# Patient Record
Sex: MALE | Race: WHITE | NOT HISPANIC OR LATINO | Employment: FULL TIME | URBAN - METROPOLITAN AREA
[De-identification: names, ages, dates, MRNs, and addresses within clinical notes are randomized per-mention and may not be internally consistent; named-entity substitution may affect disease eponyms.]

---

## 2019-08-29 ENCOUNTER — TRANSFERRED RECORDS (OUTPATIENT)
Dept: HEALTH INFORMATION MANAGEMENT | Facility: CLINIC | Age: 44
End: 2019-08-29

## 2020-10-12 ENCOUNTER — MEDICAL CORRESPONDENCE (OUTPATIENT)
Dept: HEALTH INFORMATION MANAGEMENT | Facility: CLINIC | Age: 45
End: 2020-10-12

## 2020-10-12 ENCOUNTER — TRANSFERRED RECORDS (OUTPATIENT)
Dept: HEALTH INFORMATION MANAGEMENT | Facility: CLINIC | Age: 45
End: 2020-10-12

## 2021-10-22 ENCOUNTER — OFFICE VISIT (OUTPATIENT)
Dept: FAMILY MEDICINE | Facility: CLINIC | Age: 46
End: 2021-10-22
Payer: COMMERCIAL

## 2021-10-22 VITALS
SYSTOLIC BLOOD PRESSURE: 120 MMHG | RESPIRATION RATE: 16 BRPM | HEIGHT: 77 IN | DIASTOLIC BLOOD PRESSURE: 82 MMHG | TEMPERATURE: 98 F | HEART RATE: 54 BPM | BODY MASS INDEX: 30.7 KG/M2 | OXYGEN SATURATION: 98 % | WEIGHT: 260 LBS

## 2021-10-22 DIAGNOSIS — Z13.220 SCREENING CHOLESTEROL LEVEL: ICD-10-CM

## 2021-10-22 DIAGNOSIS — Z12.11 COLON CANCER SCREENING: ICD-10-CM

## 2021-10-22 DIAGNOSIS — Z13.1 SCREENING FOR DIABETES MELLITUS: ICD-10-CM

## 2021-10-22 DIAGNOSIS — Z00.00 ROUTINE GENERAL MEDICAL EXAMINATION AT A HEALTH CARE FACILITY: Primary | ICD-10-CM

## 2021-10-22 DIAGNOSIS — Z23 NEED FOR PROPHYLACTIC VACCINATION AND INOCULATION AGAINST INFLUENZA: ICD-10-CM

## 2021-10-22 PROBLEM — Z83.719 FAMILY HISTORY OF COLONIC POLYPS: Status: ACTIVE | Noted: 2020-10-01

## 2021-10-22 PROBLEM — F33.0 MAJOR DEPRESSIVE DISORDER, RECURRENT EPISODE, MILD (H): Status: ACTIVE | Noted: 2017-08-24

## 2021-10-22 LAB
CHOLEST SERPL-MCNC: 202 MG/DL
FASTING STATUS PATIENT QL REPORTED: NO
HBA1C MFR BLD: 5 % (ref 0–5.6)
HDLC SERPL-MCNC: 39 MG/DL
LDLC SERPL CALC-MCNC: 97 MG/DL
NONHDLC SERPL-MCNC: 163 MG/DL
TRIGL SERPL-MCNC: 328 MG/DL

## 2021-10-22 PROCEDURE — 90686 IIV4 VACC NO PRSV 0.5 ML IM: CPT | Performed by: FAMILY MEDICINE

## 2021-10-22 PROCEDURE — 36415 COLL VENOUS BLD VENIPUNCTURE: CPT | Performed by: FAMILY MEDICINE

## 2021-10-22 PROCEDURE — 83036 HEMOGLOBIN GLYCOSYLATED A1C: CPT | Performed by: FAMILY MEDICINE

## 2021-10-22 PROCEDURE — 80061 LIPID PANEL: CPT | Performed by: FAMILY MEDICINE

## 2021-10-22 PROCEDURE — 90471 IMMUNIZATION ADMIN: CPT | Performed by: FAMILY MEDICINE

## 2021-10-22 PROCEDURE — 99386 PREV VISIT NEW AGE 40-64: CPT | Mod: 25 | Performed by: FAMILY MEDICINE

## 2021-10-22 RX ORDER — CETIRIZINE HYDROCHLORIDE 10 MG/1
10 TABLET ORAL DAILY PRN
COMMUNITY
End: 2022-03-17

## 2021-10-22 RX ORDER — MULTIVITAMIN,THERAPEUTIC
1 TABLET ORAL DAILY
COMMUNITY

## 2021-10-22 ASSESSMENT — ENCOUNTER SYMPTOMS
EYE PAIN: 0
ARTHRALGIAS: 0
DYSURIA: 0
PARESTHESIAS: 0
HEMATURIA: 0
SHORTNESS OF BREATH: 0
HEADACHES: 0
DIZZINESS: 0
WEAKNESS: 0
MYALGIAS: 0
ABDOMINAL PAIN: 0
PALPITATIONS: 0
HEARTBURN: 0
CHILLS: 0
NERVOUS/ANXIOUS: 0
HEMATOCHEZIA: 0
DIARRHEA: 0
CONSTIPATION: 0
FEVER: 0
SORE THROAT: 0
FREQUENCY: 0
NAUSEA: 0
COUGH: 0
JOINT SWELLING: 0

## 2021-10-22 ASSESSMENT — MIFFLIN-ST. JEOR: SCORE: 2180.69

## 2021-10-22 NOTE — PROGRESS NOTES
SUBJECTIVE:   CC: Marv Santamaria is an 46 year old male who presents for preventative health visit.       Patient has been advised of split billing requirements and indicates understanding: Yes  Healthy Habits:     Getting at least 3 servings of Calcium per day:  Yes    Bi-annual eye exam:  Yes    Dental care twice a year:  Yes    Sleep apnea or symptoms of sleep apnea:  Sleep apnea    Diet:  Regular (no restrictions)    Frequency of exercise:  2-3 days/week    Duration of exercise:  Greater than 60 minutes    Taking medications regularly:  Not Applicable    Medication side effects:  Not applicable    PHQ-2 Total Score: 0    Additional concerns today:  No      Immunizations: up to date   Cholesterol: Obtain screening today.   Aspirin: Not indicated.   Diabetes: Obtain screening today.   Colon Cancer: Obtain screening with colonoscopy. Guidelines now say age 45.   HIV screen: Negative 4/28/2018.   Hepatitis C screen: Negative 4/28/2018   Diet/Exercise: Exercising 2-3 days per week.   Tobacco: No use.     Has a therapist for anxiety and depression that he will occasionally see that is helpful and is doing well at this time with current situation.     Gout concern  Pain on 1st MTP joint.   Stated a few days again and was red and more painful.   Has been improving over last couple of days.   Has been using ibuprofen which has been helping.   First gout flare.       Today's PHQ-2 Score:   PHQ-2 ( 1999 Pfizer) 10/22/2021   Q1: Little interest or pleasure in doing things 0   Q2: Feeling down, depressed or hopeless 0   PHQ-2 Score 0   Q1: Little interest or pleasure in doing things Not at all   Q2: Feeling down, depressed or hopeless Not at all   PHQ-2 Score 0       Abuse: Current or Past(Physical, Sexual or Emotional)- No  Do you feel safe in your environment? Yes    Have you ever done Advance Care Planning? (For example, a Health Directive, POLST, or a discussion with a medical provider or your loved ones about your  wishes): No, advance care planning information given to patient to review.  Patient plans to discuss their wishes with loved ones or provider.      Social History     Tobacco Use     Smoking status: Former Smoker     Years: 2.00     Types: Cigarettes     Smokeless tobacco: Never Used   Substance Use Topics     Alcohol use: Yes     Comment: 4-5 times a week has  a couple     If you drink alcohol do you typically have >3 drinks per day or >7 drinks per week? No    Alcohol Use 10/22/2021   Prescreen: >3 drinks/day or >7 drinks/week? No   Prescreen: >3 drinks/day or >7 drinks/week? -       Last PSA: No results found for: PSA    Reviewed orders with patient. Reviewed health maintenance and updated orders accordingly - Yes      Reviewed and updated as needed this visit by clinical staff  Tobacco  Allergies  Meds  Problems  Med Hx  Surg Hx  Fam Hx  Soc Hx          Reviewed and updated as needed this visit by Provider  Tobacco  Allergies  Meds  Problems  Med Hx  Surg Hx  Fam Hx             Review of Systems   Constitutional: Negative for chills and fever.   HENT: Negative for congestion, ear pain, hearing loss and sore throat.    Eyes: Negative for pain and visual disturbance.   Respiratory: Negative for cough and shortness of breath.    Cardiovascular: Negative for chest pain, palpitations and peripheral edema.   Gastrointestinal: Negative for abdominal pain, constipation, diarrhea, heartburn, hematochezia and nausea.   Genitourinary: Negative for discharge, dysuria, frequency, genital sores, hematuria, impotence and urgency.   Musculoskeletal: Negative for arthralgias, joint swelling and myalgias.   Skin: Negative for rash.   Neurological: Negative for dizziness, weakness, headaches and paresthesias.   Psychiatric/Behavioral: Negative for mood changes. The patient is not nervous/anxious.        OBJECTIVE:   /82 (BP Location: Left arm, Patient Position: Chair, Cuff Size: Adult Large)   Pulse 54   Temp  "98  F (36.7  C) (Tympanic)   Resp 16   Ht 1.962 m (6' 5.25\")   Wt 117.9 kg (260 lb)   SpO2 98%   BMI 30.63 kg/m      Physical Exam  General: Alert, cooperative, no acute distress  Head: Normocephalic, atraumatic   Eyes: PERRL, no scleral icterus, no conjunctival injection   Ears: External ear without deformity, external canals patent, TM intact with no signs of infection   Nose: nares patent  Throat: Oropharynx clear, non-erythematous, tonsils non-enlarged   Neck: supple, trachea midline, no goiter   CV: RRR, no murmur   Lungs: Clear, non-labored breathing, No rales or rhonchi   Abdomen: Bowel sounds active, soft, non-tender, no guarding.   Extremities: No peripheral edema, calves non-tender.   Right foot: first MTP mildy erythematous. Minimal tenderness to palpation on plantar side of first MTP. Able to move joint freely with minimal discomfort. No streaking up the foot.   MSK: strength intact in upper and lower extremities. Gait normal.   Neuro: CN II-XII grossly intact. No focal deficits. Sensation intact.       ASSESSMENT/PLAN:   Marv was seen today for establish care, physical, flu shot and imm/inj.    Diagnoses and all orders for this visit:    Routine general medical examination at a health care facility  Immunizations: up to date   Cholesterol: Obtain screening today.   Aspirin: Not indicated.   Diabetes: Obtain screening today.   Colon Cancer: Obtain screening with colonoscopy. Guidelines screen age 45.   HIV screen: Negative 4/28/2018.   Hepatitis C screen: Negative 4/28/2018   Diet/Exercise: Exercising 2-3 days per week.   Tobacco: No use.     Need for prophylactic vaccination and inoculation against influenza  -     INFLUENZA VACCINE IM > 6 MONTHS VALENT IIV4 (AFLURIA/FLUZONE)    Screening for diabetes mellitus  -     Hemoglobin A1c    Screening cholesterol level  -     Lipid panel reflex to direct LDL Non-fasting    Colon cancer screening  -     Adult Gastro Ref - Procedure Only; " "Future      Patient has been advised of split billing requirements and indicates understanding: Yes  COUNSELING:   Reviewed preventive health counseling, as reflected in patient instructions       Regular exercise       Colon cancer screening    Estimated body mass index is 30.63 kg/m  as calculated from the following:    Height as of this encounter: 1.962 m (6' 5.25\").    Weight as of this encounter: 117.9 kg (260 lb).     Weight management plan: Discussed healthy diet and exercise guidelines    He reports that he has quit smoking. His smoking use included cigarettes. He quit after 2.00 years of use. He has never used smokeless tobacco.      Counseling Resources:  ATP IV Guidelines  Pooled Cohorts Equation Calculator  FRAX Risk Assessment  ICSI Preventive Guidelines  Dietary Guidelines for Americans, 2010  USDA's MyPlate  ASA Prophylaxis  Lung CA Screening    Deep Birch DO  [facility]  "

## 2021-10-22 NOTE — PATIENT INSTRUCTIONS
Dr. Foster in [facility] for vasectomy.     Continue to work on healthy diet and exercise.     Lab test today.       Patient Education     Gout Diet  Gout is a painful condition caused by an excess of uric acid, a waste product made by the body. Uric acid forms crystals that collect in the joints. The immune response to these crystals brings on symptoms of joint pain and swelling. This is called a gout attack. Often, medications and diet changes are combined to manage gout. Below are some guidelines for changing your diet to help you manage gout and prevent attacks. Your healthcare provider will help you determine the best eating plan for you.  Eating to manage gout  Weight loss for those who are overweight may help reduce gout attacks.  Eat less of these foods  Eating too many foods containing purines may raise the levels of uric acid in your body. This raises your risk for a gout attack. Try to limit these foods and drinks:    Alcohol, such as beer and red wine. You may be told to avoid alcohol completely.    Soft drinks that contain sugar or high fructose corn syrup    Certain fish, including anchovies, sardines, fish eggs, and herring    Shellfish    Certain meats, such as red meat, hot dogs, luncheon meats, and turkey    Organ meats, such as liver, kidneys, and sweetbreads    Legumes, such as dried beans and peas    Other high fat foods such as gravy, whole milk, and high fat cheeses    Vegetables such as asparagus, cauliflower, spinach, and mushrooms used to be thought to contribute to an increased risk for a gout attack, but recent studies show that high purine vegetables don't increase the risk for a gout attack.  Eat more of these foods  Other foods may be helpful for people with gout. Add some of these foods to your diet:    Cherries contain chemicals that may lower uric acid.    Omega fatty acids. These are found in some fatty fish such as salmon, certain oils (flax, olive, or nut), and nuts  themselves. Omega fatty acids may help prevent inflammation due to gout.    Dairy products that are low-fat or fat-free, such as cheese and yogurt    Complex carbohydrate foods, including whole grains, brown rice, oats, and beans    Coffee, in moderation    Water, approximately 64 ounces per day  Follow-up care  Follow up with your healthcare provider as advised.  When to seek medical advice  Call your healthcare provider right away if any of these occur:    Return of gout symptoms, usually at night:    Severe pain, swelling, and heat in a joint, especially the base of the big toe    Affected joint is hard to move    Skin of the affected joint is purple or red    Fever of 100.4 F (38 C) or higher    Pain that doesn't get better even with prescribed medicine   [employer] last reviewed this educational content on 6/1/2018 2000-2021 [employer]. All rights reserved. This information is not intended as a substitute for professional medical care. Always follow your healthcare professional's instructions.             Preventive Health Recommendations  Male Ages 40 to 49    Yearly exam:             See your health care provider every year in order to  o   Review health changes.   o   Discuss preventive care.    o   Review your medicines if your doctor has prescribed any.    You should be tested each year for STDs (sexually transmitted diseases) if you re at risk.     Have a cholesterol test every 5 years.     Have a colonoscopy (test for colon cancer) if someone in your family has had colon cancer or polyps before age 50.     After age 45, have a diabetes test (fasting glucose). If you are at risk for diabetes, you should have this test every 3 years.      Talk with your health care provider about whether or not a prostate cancer screening test (PSA) is right for you.    Shots: Get a flu shot each year. Get a tetanus shot every 10 years.     Nutrition:    Eat at least 5 servings of fruits and vegetables  daily.     Eat whole-grain bread, whole-wheat pasta and brown rice instead of white grains and rice.     Get adequate Calcium and Vitamin D.     Lifestyle    Exercise for at least 150 minutes a week (30 minutes a day, 5 days a week). This will help you control your weight and prevent disease.     Limit alcohol to one drink per day.     No smoking.     Wear sunscreen to prevent skin cancer.     See your dentist every six months for an exam and cleaning.

## 2022-01-19 ENCOUNTER — HOSPITAL ENCOUNTER (OUTPATIENT)
Facility: CLINIC | Age: 47
End: 2022-01-19
Attending: SURGERY | Admitting: SURGERY
Payer: COMMERCIAL

## 2022-01-19 ENCOUNTER — TELEPHONE (OUTPATIENT)
Dept: SLEEP MEDICINE | Facility: CLINIC | Age: 47
End: 2022-01-19
Payer: COMMERCIAL

## 2022-01-19 NOTE — TELEPHONE ENCOUNTER
Reason for Call:  Other call back    Detailed comments: patient has an appt in March but needs supplies before the visit    Phone Number Patient can be reached at: Home number on file [phone] (home)    Best Time: 8-3    Can we leave a detailed message on this number? YES    Call taken on 1/19/2022 at 11:52 AM by Chyna Rubi

## 2022-01-20 NOTE — TELEPHONE ENCOUNTER
Patient was informed he would need to establish care prior to orders being signed. Patient also instructed to have previous sleep study sent to sleep center.

## 2022-01-21 ENCOUNTER — TELEPHONE (OUTPATIENT)
Dept: FAMILY MEDICINE | Facility: CLINIC | Age: 47
End: 2022-01-21
Payer: COMMERCIAL

## 2022-01-21 DIAGNOSIS — G47.33 OSA (OBSTRUCTIVE SLEEP APNEA): Primary | ICD-10-CM

## 2022-01-21 NOTE — TELEPHONE ENCOUNTER
"Writer noted the following message from the patient in the \"Pt CRM Request\" folder:        Hina Marv MEDRANO  Patient Customer Service Request Pool 2 days ago     Topic: Non-Medical Question.     I received a cpap maching from [employer] two years ago. I need to order new tubing and head gear for it, but [facility] is no longer in network. Can Dr Birch do a prescription for this, or will it require I see a new sleep doctor?     Thanks, Kelvin        Routed to PCP for review.    Padma Kumar RN  [facility]  "

## 2022-02-10 ENCOUNTER — OFFICE VISIT (OUTPATIENT)
Dept: FAMILY MEDICINE | Facility: CLINIC | Age: 47
End: 2022-02-10
Payer: COMMERCIAL

## 2022-02-10 VITALS
HEIGHT: 77 IN | BODY MASS INDEX: 31.41 KG/M2 | TEMPERATURE: 97.4 F | RESPIRATION RATE: 16 BRPM | SYSTOLIC BLOOD PRESSURE: 130 MMHG | DIASTOLIC BLOOD PRESSURE: 87 MMHG | HEART RATE: 59 BPM | WEIGHT: 266 LBS

## 2022-02-10 DIAGNOSIS — Z30.2 ENCOUNTER FOR STERILIZATION: Primary | ICD-10-CM

## 2022-02-10 PROCEDURE — 99212 OFFICE O/P EST SF 10 MIN: CPT | Performed by: FAMILY MEDICINE

## 2022-02-10 RX ORDER — DIAZEPAM 10 MG
TABLET ORAL
Qty: 1 TABLET | Refills: 0 | Status: SHIPPED | OUTPATIENT
Start: 2022-02-10 | End: 2022-03-17

## 2022-02-10 RX ORDER — OXYCODONE AND ACETAMINOPHEN 5; 325 MG/1; MG/1
1 TABLET ORAL EVERY 6 HOURS PRN
Qty: 12 TABLET | Refills: 0 | Status: SHIPPED | OUTPATIENT
Start: 2022-02-10 | End: 2022-02-13

## 2022-02-10 ASSESSMENT — SLEEP AND FATIGUE QUESTIONNAIRES
HOW LIKELY ARE YOU TO NOD OFF OR FALL ASLEEP WHILE SITTING AND READING: SLIGHT CHANCE OF DOZING
HOW LIKELY ARE YOU TO NOD OFF OR FALL ASLEEP WHILE SITTING AND TALKING TO SOMEONE: WOULD NEVER DOZE
HOW LIKELY ARE YOU TO NOD OFF OR FALL ASLEEP IN A CAR, WHILE STOPPED FOR A FEW MINUTES IN TRAFFIC: WOULD NEVER DOZE
HOW LIKELY ARE YOU TO NOD OFF OR FALL ASLEEP WHILE LYING DOWN TO REST IN THE AFTERNOON WHEN CIRCUMSTANCES PERMIT: SLIGHT CHANCE OF DOZING
HOW LIKELY ARE YOU TO NOD OFF OR FALL ASLEEP WHILE SITTING INACTIVE IN A PUBLIC PLACE: WOULD NEVER DOZE
HOW LIKELY ARE YOU TO NOD OFF OR FALL ASLEEP WHILE WATCHING TV: WOULD NEVER DOZE
HOW LIKELY ARE YOU TO NOD OFF OR FALL ASLEEP WHILE SITTING QUIETLY AFTER LUNCH WITHOUT ALCOHOL: WOULD NEVER DOZE
HOW LIKELY ARE YOU TO NOD OFF OR FALL ASLEEP WHEN YOU ARE A PASSENGER IN A CAR FOR AN HOUR WITHOUT A BREAK: WOULD NEVER DOZE

## 2022-02-10 ASSESSMENT — ANXIETY QUESTIONNAIRES
7. FEELING AFRAID AS IF SOMETHING AWFUL MIGHT HAPPEN: NOT AT ALL
3. WORRYING TOO MUCH ABOUT DIFFERENT THINGS: SEVERAL DAYS
5. BEING SO RESTLESS THAT IT IS HARD TO SIT STILL: NOT AT ALL
6. BECOMING EASILY ANNOYED OR IRRITABLE: NOT AT ALL
GAD7 TOTAL SCORE: 2
1. FEELING NERVOUS, ANXIOUS, OR ON EDGE: SEVERAL DAYS
2. NOT BEING ABLE TO STOP OR CONTROL WORRYING: NOT AT ALL

## 2022-02-10 ASSESSMENT — MIFFLIN-ST. JEOR: SCORE: 2207.91

## 2022-02-10 ASSESSMENT — PAIN SCALES - GENERAL: PAINLEVEL: NO PAIN (0)

## 2022-02-10 ASSESSMENT — PATIENT HEALTH QUESTIONNAIRE - PHQ9
SUM OF ALL RESPONSES TO PHQ QUESTIONS 1-9: 3
5. POOR APPETITE OR OVEREATING: NOT AT ALL

## 2022-02-10 NOTE — PROGRESS NOTES
"SUBJECTIVE:                                                    Marv Santamaria is a 46 year old male who presents to clinic today for the following health issues:    Chief Complaint   Patient presents with     Consult     for vasectomy     Answers for HPI/ROS submitted by the patient on 2/10/2022  How many servings of fruits and vegetables do you eat daily?: 2-3  On average, how many sweetened beverages do you drink each day (Examples: soda, juice, sweet tea, etc.  Do NOT count diet or artificially sweetened beverages)?: 0  How many minutes a day do you exercise enough to make your heart beat faster?: 9 or less  How many days a week do you exercise enough to make your heart beat faster?: 3 or less  How many days per week do you miss taking your medication?: 0        OBJECTIVE:                                                    /87   Pulse 59   Temp 97.4  F (36.3  C) (Tympanic)   Resp 16   Ht 1.962 m (6' 5.25\")   Wt 120.7 kg (266 lb)   BMI 31.34 kg/m   Body mass index is 31.34 kg/m .     SUBJECTIVE:  nathalie comes in for a vasectomy consultation.  He and his partner have decided they don't want to have any more children. They have decided that he will have the sterilization procedure instead of her.  Patient was given information to read prior to the consultation.      We talked about the risks and benefits of the vasectomy; risks being that of potential for bleeding, infection, postoperative discomfort immediately which can last for a number of weeks afterwards, also the development of spermatoceles or sperm granulomas, epididymal cysts and the possibility of failure of the procedure producing failed attempt at sterility.     Also mentioned to the patient that there is some literature out there that suggests men who have vasectomies are at increased risk for prostate cancer; however, this literature is inconclusive and controversial.  It is recommended that men who have vasectomies should have annual " prostate exams through the rectum yearly after age 40 and also may benefit from a screening prostatic specific antigen test after age 40.  We also discussed signs and symptoms of prostatic enlargement or prostatic problems.     I went through the procedure with the patient, how it is done, a midline incision in the scrotum measuring approximately 1/2 inch in length.  The vas deferens is brought up through this incision, dissected free and a small portion, maybe 0.5 cm. in length is removed.   cauterized and then the proximal or distal end is buried away from the other.  Patient had no questions when it came to the procedure itself.  I did show him pictures and diagrams of the procedure.  I showed him where a potential spermatocele or sperm granuloma can occur.      Again, the patient had no further questions for me.    OBJECTIVE:  On exam, this is a well-developed, well-nourished white male.      Exam reveals a normal circumcised penis, no lesions.  Testes are descended bilaterally.  Exam was limited to the genitalia.  Both vas deferens were easily identified.  No other abnormalities were noted.      ASSESSMENT:  Undesired fertility in an adult male, requesting vasectomy.    PLAN:  He will schedule a vasectomy at his convenience for either the last appointment of the morning He is aware that he will need to take the entire weekend off and have essentially bedrest for two days with ice packs every two hours and ibuprofen for discomfort.  I gave him a prescription for ibuprofen 800 mg. to take every six to eight hours with food after the procedure.  He will take one tablet half an hour before the procedure along with Valium 10 mg.    If he has any further questions, he will contact me prior to the procedure or ask me on the day of the procedure.  He also is aware that he will need to bring a specimen after 10-12 ejaculations to make sure that he has cleared the storage vesicles of any residual sperm and to make sure  that he is sterile.

## 2022-02-11 ENCOUNTER — TELEPHONE (OUTPATIENT)
Dept: SLEEP MEDICINE | Facility: CLINIC | Age: 47
End: 2022-02-11
Payer: COMMERCIAL

## 2022-02-11 ASSESSMENT — ANXIETY QUESTIONNAIRES: GAD7 TOTAL SCORE: 2

## 2022-02-11 NOTE — TELEPHONE ENCOUNTER
Reason for call:  Other   Patient called regarding (reason for call): call back  Additional comments: Patient is having a hard time trying to update the data for the CPAP, Patient would like to bring his CPAP in to get the data off the CPAP before his appointment with Asim on 2/15    Phone number to reach patient:  Cell number on file:    Telephone Information:   Mobile [phone]       Best Time:  Anytime    Can we leave a detailed message on this number?  YES    Travel screening: Not Applicable

## 2022-02-11 NOTE — TELEPHONE ENCOUNTER
Patient has a [email or web address], [employer] has remote access. Our office will contact  [facility] for copy of download to be faxed to our clinic.     Phone call to  [facility] (ph. [phone]) to fax a detailed compliance and therapy cpap DL to fax [phone].

## 2022-02-12 DIAGNOSIS — Z11.59 ENCOUNTER FOR SCREENING FOR OTHER VIRAL DISEASES: Primary | ICD-10-CM

## 2022-02-15 ENCOUNTER — VIRTUAL VISIT (OUTPATIENT)
Dept: SLEEP MEDICINE | Facility: CLINIC | Age: 47
End: 2022-02-15
Payer: COMMERCIAL

## 2022-02-15 VITALS — BODY MASS INDEX: 28.93 KG/M2 | HEIGHT: 78 IN | WEIGHT: 250 LBS

## 2022-02-15 DIAGNOSIS — G47.33 OSA (OBSTRUCTIVE SLEEP APNEA): Primary | ICD-10-CM

## 2022-02-15 PROCEDURE — 99205 OFFICE O/P NEW HI 60 MIN: CPT | Mod: 95 | Performed by: INTERNAL MEDICINE

## 2022-02-15 ASSESSMENT — MIFFLIN-ST. JEOR: SCORE: 2147.24

## 2022-02-15 NOTE — PATIENT INSTRUCTIONS
Your BMI is Body mass index is 28.89 kg/m .  Weight management is a personal decision.  If you are interested in exploring weight loss strategies, the following discussion covers the approaches that may be successful. Body mass index (BMI) is one way to tell whether you are at a healthy weight, overweight, or obese. It measures your weight in relation to your height.  A BMI of 18.5 to 24.9 is in the healthy range. A person with a BMI of 25 to 29.9 is considered overweight, and someone with a BMI of 30 or greater is considered obese. More than two-thirds of American adults are considered overweight or obese.  Being overweight or obese increases the risk for further weight gain. Excess weight may lead to heart disease and diabetes.  Creating and following plans for healthy eating and physical activity may help you improve your health.  Weight control is part of healthy lifestyle and includes exercise, emotional health, and healthy eating habits. Careful eating habits lifelong are the mainstay of weight control. Though there are significant health benefits from weight loss, long-term weight loss with diet alone may be very difficult to achieve- studies show long-term success with dietary management in less than 10% of people. Attaining a healthy weight may be especially difficult to achieve in those with severe obesity. In some cases, medications, devices and surgical management might be considered.  What can you do?  If you are overweight or obese and are interested in methods for weight loss, you should discuss this with your provider.     Consider reducing daily calorie intake by 500 calories.     Keep a food journal.     Avoiding skipping meals, consider cutting portions instead.    Diet combined with exercise helps maintain muscle while optimizing fat loss. Strength training is particularly important for building and maintaining muscle mass. Exercise helps reduce stress, increase energy, and improves fitness.  Increasing exercise without diet control, however, may not burn enough calories to loose weight.       Start walking three days a week 10-20 minutes at a time    Work towards walking thirty minutes five days a week     Eventually, increase the speed of your walking for 1-2 minutes at time    And look into health and wellness programs that may be available through your health insurance provider, employer, local community center, or [employer].

## 2022-02-15 NOTE — PROGRESS NOTES
Marv is a 46 year old who is being evaluated via a billable video visit.       How would you like to obtain your AVS? MyChart  If the video visit is dropped, the invitation should be resent by: Send to e-mail at: [email or web address]  Will anyone else be joining your video visit? No    Zuleyka Le Start Time:2:30pm  Video-Visit Details     Type of service:  Video Visit     Video End Time: 3:20 pm     Originating Location (pt. Location): Home     Distant Location (provider location):  [facility] [place]      Platform used for Video Visit: misterbnb    [facility]  Referral for: JANIS   Patient Name: Marv Santamaria  MRN: [id number]  : 1975  Date of Clinic Visit: February 15, 2022  Primary Care Provider: Jennie Ref-Primary, Physician  Referring Provider: Deep Birch  -------------------------------------------------------------------------------------------------------------------------------  CHIEF COMPLAINT: JANIS     HISTORY OF PRESENT ILLNESS:  Marv Santamaria is a 46 year old male w/ PMH JANIS presenting for evaluation of JANIS   His insurance switched so wanted to establish care with new provider in system and want to start coming to [facility]. He also needs new supplies, hasnt updated supplies since 2019.    Patient has has had a prior home sleep study about 2.5 years ago. Important findings: moderately severe mixed obstructive/central sleep apnea.    Obstructive sleep apnea     DME: [facility]   Home sleep study 9/3/2019 with [facility] in [place].  Nocturnal-T3 device moderately severe mixed obstructive/central sleep apnea.  Obstructive apneas 29, central apneas 23.  Sleep associated hypoxemia with sleep time with SPO2 less than 88% was 5.2 minutes.   Prior to study he was having general fatigue and snoring- with CPAP better quality sleep     Respironics   21-22  Auto-PAP 9 - 20 cmH2O 30 day usage data:    100% of days with >  4 hours of use. 0/30 days with no use.   Average use 7 hr 14 minutes per day.   Average % of night in large leak 0 sec.    CPAP 90% pressure 10 cm H2O.   AHI 2 events per hour.  No so clean- ozone  usage   No air hunger or too much pressrue  - He registered device since his device is under Respironics recall    - Uses nasal mask     He has stopped using CPAP bc he was having a bad reaction to the his mask and tubing.  He says that he has had the same tubing and mask since 2019-he has noticed a nasal congestion and rhinitis-the symptoms go away after 24 hours when not using mask.   -He has not noticed any black particles or change in smell and the mask.    -Since he has not been using the CPAP the last month, he has been more tired and not getting as deep of sleep and is snoring.      Patient goes camping a few weeks out of the year where he does not have access to an outlet, and was inquiring about different possible treatments for when he is camping.    Sleep scores  ESS: 2/24  KYLAH:4    Sleep-Wake schedule  Bedtime: 9pm   Sleep onset: 20 mins   Nocturnal awakenings: once/night for 5/7 nights.   Wake time: 5-6 am   Weekend: 10pm - 6am   Sleep inertia: yes   Naps: no   Preferred sleep position: side   Shift work?:no       Sleep-related behaviors  Restless legs: no   Active sleeper?: no   Dream enactment: no  Sleep walking: no     Sleep hygiene  Sleep partners: girlfriend   Caffeine: 2-4 cups/coffee, morning   EtOH: 1-2 drinks 5 days/week   Tobacco: no   Sleep-aids: no     Family history  Pertinent positives: mom sleep apnea and strokes     REVIEW OF SYSTEMS: 12-point RoS negative except as per HPI.      No Known Allergies  Medications:  Multivitamins   No past medical history on file.  Past Surgical History:   Procedure Laterality Date     APPENDECTOMY      at age 10     SHOULDER SURGERY Left     Broke shoulder socket and dislocated shoulder in 2013     Family History   Problem Relation Age of Onset     Colon  Polyps Mother      Kidney Disease Father      Cerebrovascular Disease Paternal Grandmother      Colon Polyps Sister      Seizure Disorder Daughter        PHYSICAL EXAMINATION:  Vitals: no vitals were taken for this virtual visit  Body mass index is 28.89 kg/m .  General: in no acute distress   Pulmonary: Symmetrical pattern of breathing  Neuro: alert; language is fluent with intact comprehension, no facial asymmetry or ptosis, normal speech   Psych: cooperative, appropriate mood and affect    INVESTIGATIONS:  Prior PSG: Home sleep [facility]/3/[facility] [place] [facility] [place].  Nocturnal-T3 device moderately severe mixed obstructive/central sleep apnea.  Obstructive apneas 29, central apneas 23.  Sleep associated hypoxemia with sleep time with SPO2 less than 88% was 5.2 minutes.     ASSESSMENT AND PLAN:  Marv Santamaria is a 46 year old male w/ PMH JANIS presenting for evaluation of JANIS     # Hx of JANIS    - Residual AHI is at goal  - Patient is is meeting use compliance requirements up until he stopped using the mask due to nasal congestion and rhinits, and he was getting subjective benefit in sleep improvement and daytime energy levels.  -Patient amenable to continue using CPAP once he gets new supplies  - Can continue same pressure of APAP 9-20 cm H2O   - new PAP supplies  to [facility]   -For patient's inquiry about a travel CPAP, will place a DME order(auto CPAP 9-20 cmH2O) so patient can discuss with [facility] about the possibility of obtaining a travel CPAP  .    #Overweight : we discussed weight management with diet and exercise    Patient was strongly advised to avoid driving, operating any heavy machinery or other hazardous situations while drowsy or sleepy.  Patient was counseled on the importance of driving while alert, to pull over if drowsy, or nap before getting into the vehicle if sleepy.     Follow-up at sleep clinic in 1 year or sooner if any concerns.    The plan was formulated with  "Dr. Condon.    Marylin Motley MD  Sleep Medicine Fellow  [facility]     The above note was dictated using voice recognition software. Although reviewed after completion, some word and grammatical error may remain . Please contact the author for any clarifications.     Attending: As the attending physician I was present with  Dr. Marylin Motley, during this clinic visit. I personally reviewed the laguerre aspects of the history, chart review and discussed the plan with the patient and I agree with the above documentation.    \" Total time spent was 60 minutes with Marv Santamaria  during today's video visit, most of this time was spent counseling the patient and  coordinating care regarding JANIS, CPAP treatment, travel auto CPAP device, reviewing compliance download, reviewing previous sleep study report, weight management , and chart review., including documentation and further activities as noted above.\"     David Condon MD  [facility]  [address], [address], [place] [address]      "

## 2022-02-15 NOTE — PROGRESS NOTES
"Marv is a 46 year old who is being evaluated via a billable video visit.      How would you like to obtain your AVS? MyChart  If the video visit is dropped, the invitation should be resent by: Send to e-mail at: [email or web address]  Will anyone else be joining your video visit? No  {If patient encounters technical issues they should call [phone] :[id number]}    Video Start Time: {video visit start/end time for provider to select:152948}  Video-Visit Details    Type of service:  Video Visit    Video End Time:{video visit start/end time for provider to select:152948}    Originating Location (pt. Location): {video visit patient location:980809::\"Home\"}    Distant Location (provider location):  [facility]     Platform used for Video Visit: {Virtual Visit Platforms:698150::\"[employer]\"}  "

## 2022-03-01 ENCOUNTER — MYC MEDICAL ADVICE (OUTPATIENT)
Dept: SLEEP MEDICINE | Facility: CLINIC | Age: 47
End: 2022-03-01
Payer: COMMERCIAL

## 2022-03-17 ENCOUNTER — OFFICE VISIT (OUTPATIENT)
Dept: FAMILY MEDICINE | Facility: CLINIC | Age: 47
End: 2022-03-17
Payer: COMMERCIAL

## 2022-03-17 VITALS
BODY MASS INDEX: 32.13 KG/M2 | RESPIRATION RATE: 15 BRPM | TEMPERATURE: 97.6 F | HEIGHT: 77 IN | WEIGHT: 272.1 LBS | HEART RATE: 53 BPM | SYSTOLIC BLOOD PRESSURE: 135 MMHG | OXYGEN SATURATION: 100 % | DIASTOLIC BLOOD PRESSURE: 86 MMHG

## 2022-03-17 DIAGNOSIS — Z30.2 ENCOUNTER FOR STERILIZATION: Primary | ICD-10-CM

## 2022-03-17 PROCEDURE — 55250 REMOVAL OF SPERM DUCT(S): CPT | Performed by: FAMILY MEDICINE

## 2022-03-17 PROCEDURE — 99207 PR DROP WITH A PROCEDURE: CPT | Performed by: FAMILY MEDICINE

## 2022-03-17 NOTE — TELEPHONE ENCOUNTER
[facility] Transfer Team staff, Hafsa KRISHNAN, called and left  for pt on 3/7/22 about ordering supplies. He needed to complete his transfer from previous DME company before he could order supplies. Transfer is complete.

## 2022-03-27 NOTE — PROGRESS NOTES
Marv Santamaria is a 46 year old male here for vasectomy.     He has been in previously for vasectomy consult in which we discussed the no-scapel procedure, including risks and benefits, and other options of birth control.  All questions have been answered and the consent form is signed.  Pause was performed with correct identification and procedure.          Procedure:  He was placed in the supine position on the procedure table.  He was prepped and draped in the usual sterile fashion.  The vas was identified and brought up to the surface.  The skin overlying the vas was anesthetized with lidocaine and further lidocaine injected into the vas sheath.  The no-scaple vas clamp was used to grasp the vas and the dissecting instrument was used to puncture the skin and dissect out the vas free of tissue. A segment of the vas was removed, both ends were burned and closed with intralumen coutery.  The distal end was buried under fascia.  The procedure was repeated for the other vas.  There was no complications.  He tolerated the procedure well.  The scrotum was cleaned free of betadine and bacitracin ointment was applied over the skin incisions.         A:  Vasectomy         P:  He was given post-vasectomy instructions, including   containers for post vas specimens.  He should apply ice   and wear support for the next 2-3 days.  He should  abstain from sexual intercourse and any heavy lifting for the next week.  Call or return to clinic for any  problems.  The excised specimens were sent for pathology.  He may use Ibuprofen 800 mg po TID prn for  pain and was given a prescription for percocet.  He is not  considered sterile until follow up vas specimens are free of semen.

## 2022-09-12 ENCOUNTER — TELEPHONE (OUTPATIENT)
Dept: SLEEP MEDICINE | Facility: CLINIC | Age: 47
End: 2022-09-12

## 2022-09-12 NOTE — TELEPHONE ENCOUNTER
RT PT CALL  REGARDING CPAP SUPPLIES AND LVM INFORMING THE PT THAT THE MOST RECENT ORDER WENT TOWARDS THE DEDUCTIBLE AND THAT IS WHY INSURANCE MOST LIKELY DID NOT COVER. LET THE PT KNOW TO GIVE ME A CALL BACK IF WANTING TO DISCUSS FURTHER [phone].

## 2022-11-21 ENCOUNTER — HEALTH MAINTENANCE LETTER (OUTPATIENT)
Age: 47
End: 2022-11-21

## 2022-12-25 ENCOUNTER — HEALTH MAINTENANCE LETTER (OUTPATIENT)
Age: 47
End: 2022-12-25

## 2023-02-27 ENCOUNTER — LAB (OUTPATIENT)
Dept: LAB | Facility: CLINIC | Age: 48
End: 2023-02-27
Payer: COMMERCIAL

## 2023-02-27 DIAGNOSIS — Z30.2 ENCOUNTER FOR STERILIZATION: ICD-10-CM

## 2023-02-27 LAB
SEMEN ANALYSIS P VAS PNL: NORMAL
SPERM MOTILE SMN QL MICRO: NORMAL

## 2023-02-27 PROCEDURE — 89321 SEMEN ANAL SPERM DETECTION: CPT

## 2023-04-18 ENCOUNTER — TELEPHONE (OUTPATIENT)
Dept: FAMILY MEDICINE | Facility: CLINIC | Age: 48
End: 2023-04-18
Payer: COMMERCIAL

## 2023-04-18 NOTE — TELEPHONE ENCOUNTER
Patient Quality Outreach    Patient is due for the following:   Colon Cancer Screening  Physical Preventive Adult Physical    Next Steps:   Schedule a Adult Preventative    Type of outreach:    Sent [employer] message.      Questions for provider review:    None     Cris Stapleton, [facility]

## 2024-02-04 ENCOUNTER — HEALTH MAINTENANCE LETTER (OUTPATIENT)
Age: 49
End: 2024-02-04

## 2025-03-02 ENCOUNTER — HEALTH MAINTENANCE LETTER (OUTPATIENT)
Age: 50
End: 2025-03-02